# Patient Record
(demographics unavailable — no encounter records)

---

## 2025-01-28 NOTE — HISTORY OF PRESENT ILLNESS
[FreeTextEntry1] : Pierce is a 77-year-old male who presents to our office for continued care of thick, hard callus, difficulty with his toenails and long toenails.  He suffers from poor circulation. He is under the care of Waylon Monsalve. Date last seen: 07/15/24.

## 2025-01-28 NOTE — PROCEDURE
[FreeTextEntry1] : Plan:  Exam.  Medial to 1st metatarsal head left, lateral to the 5th metatarsal head left, medial left heel, medial to the 1st metatarsal head right, lateral to the 5th metatarsal right, medial right heel.  Surgical debridement of the hyperkeratosis.  (Ks=[id number]).  Left 1st toenail, right 1st toenail, right 4th toenail.  Mycotic toenails were debrided by manual clipper and electric  to tolerance. (Eb=[id number]).  Left 2nd toenail, left 3rd toenail, left 4th toenail, left 5th toenail, right 2nd toenail, right 3rd toenail, right 5th toenail.  Elongated toenails were trimmed. (Ks=[id number]).  Patient to return: 9 weeks.

## 2025-01-28 NOTE — PHYSICAL EXAM
[FreeTextEntry3] : Class findings (Q8) indicated due to abs L posterior tibial pulse, abs R posterior tibial pulse, abs L dorsalis pedis pulse, abs R dorsalis pedis pulse, hair growth decreased or absent, nail changes (thickening), pigmentary changes (discoloration), skin texture L (thin, shiny), skin texture R (thin, shiny) and temperature changes.  Skin intact, no infection.       [FreeTextEntry1] : Dermatological Exam:   Medial to 1st metatarsal head left, lateral to the 5th metatarsal head left, medial left heel, medial to the 1st metatarsal head right, lateral to the 5th metatarsal right, medial right heel.  Callus formation. It is thick.  Pain; subject to breakdown.  Left 1st toenail, right 1st toenail, right 4th toenail.  Mycotic toenails.  They are thick, discolored with lysing distally.  Left 2nd toenail, left 3rd toenail, left 4th toenail, left 5th toenail, right 2nd toenail, right 3rd toenail, right 5th toenail.  Elongated toenails.  Skin intact.  No signs of bacterial infection at this time.

## 2025-01-28 NOTE — REVIEW OF SYSTEMS
[Negative] : Constitutional [FreeTextEntry5] : PVD [de-identified] : callus, thick toenails, long toenails